# Patient Record
Sex: FEMALE | Race: WHITE | NOT HISPANIC OR LATINO | Employment: FULL TIME | URBAN - METROPOLITAN AREA
[De-identification: names, ages, dates, MRNs, and addresses within clinical notes are randomized per-mention and may not be internally consistent; named-entity substitution may affect disease eponyms.]

---

## 2022-07-07 PROBLEM — E66.811 OBESITY (BMI 30.0-34.9): Status: ACTIVE | Noted: 2022-07-07

## 2024-09-27 ENCOUNTER — OFFICE VISIT (OUTPATIENT)
Dept: GASTROENTEROLOGY | Facility: CLINIC | Age: 54
End: 2024-09-27
Payer: COMMERCIAL

## 2024-09-27 VITALS
HEART RATE: 83 BPM | DIASTOLIC BLOOD PRESSURE: 84 MMHG | WEIGHT: 177 LBS | HEIGHT: 65 IN | SYSTOLIC BLOOD PRESSURE: 130 MMHG | BODY MASS INDEX: 29.49 KG/M2

## 2024-09-27 DIAGNOSIS — K44.9 HIATAL HERNIA: ICD-10-CM

## 2024-09-27 DIAGNOSIS — R10.13 EPIGASTRIC PAIN: Primary | ICD-10-CM

## 2024-09-27 DIAGNOSIS — K21.9 GASTROESOPHAGEAL REFLUX DISEASE, UNSPECIFIED WHETHER ESOPHAGITIS PRESENT: ICD-10-CM

## 2024-09-27 PROCEDURE — 99999 PR PBB SHADOW E&M-EST. PATIENT-LVL IV: CPT | Mod: PBBFAC,,,

## 2024-09-27 NOTE — PROGRESS NOTES
"    [facility] Consultation Note    Reason for Consult:  Diagnoses of Epigastric pain and Hiatal hernia were pertinent to this visit.    PCP:   Michael Porras   [address] / [facility] [address]    Referring MD:  Juan M Daniel, Np  [address]  [address]  [place] [address]    HPI:  This is a 54 y.o. female here for evaluation of acid reflux for 3-4 years that has worsened in the past 6-12 months. Pt reports epigastric and esophageal pressure-like pain and fullness. She has been taking OTC nexium intermittently for the past 2 years. She was started on Nexium 40mg 10 days ago which has provided significant improvement in heartburn and regurgitation while laying down. Denies dysphagia.     Reports Hx of H.pylori that was treated with antibiotics several years ago. Pt then had similar symptoms last year and had HP antibody IgG test come back positive. States she never did a stool test, but reports being treated with antibiotics again. Pt does not remember which antibiotics. Takes Goody powder about 3x/week. Drinks 3 days out of the week and has about 5-6 drinks each time. States she has been avoiding alcohol for the past few weeks. Denies tobacco use.     Reports regular bowel movements daily. Denies diarrhea, constipation, bloody stools, rectal bleeding, melena, or unintentional weight loss. Denies known FHx of GI malignancies. Has not had any CRC screening yet.     Objective Findings:    Vital Signs:  /84   Pulse 83   Ht 5' 5" (1.651 m)   Wt 80.3 kg (177 lb 0.5 oz)   BMI 29.46 kg/m²   Body mass index is 29.46 kg/m².    Physical Exam:  General Appearance: Well appearing in no acute distress  Abdomen: Soft, non tender, non distended in all four quadrants. No hepatosplenomegaly, ascites, or mass.    I have personally reviewed labs and imaging results.     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   Small short segment intussusception of the small bowel.  This is most likely transient in nature.   Scattered solid and subsolid pulmonary nodules throughout the lung bases.  Findings may represent infectious/inflammatory process within lung neoplastic process not definitively ruled out.  Consider follow-up with CT chest in 3 months for recharacterization.   Suspected cholelithiasis without evidence of cholecystitis.   Mild circumferential bladder wall thickening, although likely related to underdistension of the bladder.  Correlate with urinalysis.   Additional findings as above.      Assessment:  1. Epigastric pain    2. Hiatal hernia      This is a 54 y.o F who presents with acid reflux for 3-4 years that has worsened in the past 6-12 months. Pt reports epigastric and esophageal pressure-like pain and fullness. Recently started Nexium qd with relief of reflux, but still endorses epigastric pressure. Hx of Hpylori in the past. Pt doesn't want to stop Nexium use to do stool test. +NSAID use.     - Ordered EGD/Colonoscopy  - Continue taking Nexium daily  - Avoid NSAIDs and alcohol  - F/u w PCP to get repeat CT, pulm nodules    RTC 2 months.     Thank you so much for allowing me to participate in the care of Lisy Strong    Sarah Abukhader, PA-C [facility]

## 2024-09-27 NOTE — PATIENT INSTRUCTIONS
Acid Reflux Tips  - Stay upright for at least 3 hours after eating  - Raise the head of the bed 4 to 6 inches    - Decrease excess weight    - Avoid citrus juices, acidic foods, alcohol, chocolate, caffeinated and carbonated beverages, fatty and fried foods   - Avoid tight-fitting clothing  - Avoid cigarettes and other tobacco products  - Take antacids (E.g. Tums, Rolaids, Maalox, Mylanta) for breakthrough symptoms  - If prescribed a Proton Pump Inhibitor (Protonix, Prilosec, Nexium, etc), take it 30-60 minutes before meals.

## 2024-10-01 ENCOUNTER — TELEPHONE (OUTPATIENT)
Dept: ENDOSCOPY | Facility: HOSPITAL | Age: 54
End: 2024-10-01
Payer: COMMERCIAL

## 2024-10-01 ENCOUNTER — PATIENT MESSAGE (OUTPATIENT)
Dept: ENDOSCOPY | Facility: HOSPITAL | Age: 54
End: 2024-10-01
Payer: COMMERCIAL

## 2024-10-01 NOTE — TELEPHONE ENCOUNTER
"----- Message from Drew Castanon sent at 9/30/2024  9:33 AM CDT -----  Regarding: FW: EGD/Colonoscopy    ----- Message -----  From: Paige Mcgraw PA-C  Sent: 9/27/2024  11:57 AM CDT  To: [facility]  Subject: EGD/Colonoscopy                                  Procedure: EGD/Colonoscopy    Diagnosis: Screening colonoscopy and Abdominal pain    Procedure Timing: Within 4 weeks (Urgent)    #If within 4 weeks selected, please fannie as high priority#    #If greater than 12 weeks, please select "5-12 weeks" and delay sending until 3 months prior to requested date#     Location: Any Site    Additional Scheduling Information: No scheduling concerns    Prep Specifications:Standard prep    Is the patient taking a GLP-1 Agonist:no    Have you attached a patient to this message: yes  "

## 2024-10-10 ENCOUNTER — TELEPHONE (OUTPATIENT)
Dept: INTERNAL MEDICINE | Facility: CLINIC | Age: 54
End: 2024-10-10
Payer: COMMERCIAL

## 2024-10-11 DIAGNOSIS — R09.81 SINUS CONGESTION: ICD-10-CM

## 2024-10-11 RX ORDER — AZELASTINE 1 MG/ML
1 SPRAY, METERED NASAL 2 TIMES DAILY
Qty: 30 ML | Refills: 0 | Status: SHIPPED | OUTPATIENT
Start: 2024-10-11 | End: 2025-10-11

## 2024-10-11 NOTE — TELEPHONE ENCOUNTER
No care due was identified.  [facility] Embedded Care Due Messages. Reference number: [id number].   10/11/2024 10:19:05 AM CDT

## 2024-10-22 ENCOUNTER — PATIENT MESSAGE (OUTPATIENT)
Dept: RESEARCH | Facility: HOSPITAL | Age: 54
End: 2024-10-22
Payer: COMMERCIAL

## 2024-12-04 ENCOUNTER — PATIENT MESSAGE (OUTPATIENT)
Dept: INTERNAL MEDICINE | Facility: CLINIC | Age: 54
End: 2024-12-04
Payer: COMMERCIAL

## 2024-12-04 DIAGNOSIS — R91.8 OTHER NONSPECIFIC ABNORMAL FINDING OF LUNG FIELD: ICD-10-CM

## 2024-12-04 DIAGNOSIS — R91.8 MULTIPLE LUNG NODULES: Primary | ICD-10-CM

## 2024-12-17 ENCOUNTER — HOSPITAL ENCOUNTER (OUTPATIENT)
Dept: RADIOLOGY | Facility: OTHER | Age: 54
Discharge: HOME | End: 2024-12-17
Payer: COMMERCIAL

## 2024-12-17 DIAGNOSIS — R91.8 MULTIPLE LUNG NODULES: ICD-10-CM

## 2024-12-17 DIAGNOSIS — R91.8 OTHER NONSPECIFIC ABNORMAL FINDING OF LUNG FIELD: ICD-10-CM

## 2024-12-17 PROCEDURE — 71250 CT THORAX DX C-: CPT | Mod: 26,,, | Performed by: RADIOLOGY

## 2024-12-17 PROCEDURE — 71250 CT THORAX DX C-: CPT | Mod: TC

## 2025-01-05 ENCOUNTER — PATIENT MESSAGE (OUTPATIENT)
Dept: INTERNAL MEDICINE | Facility: CLINIC | Age: 55
End: 2025-01-05
Payer: COMMERCIAL

## 2025-01-07 ENCOUNTER — TELEPHONE (OUTPATIENT)
Dept: INTERNAL MEDICINE | Facility: CLINIC | Age: 55
End: 2025-01-07
Payer: COMMERCIAL

## 2025-01-29 ENCOUNTER — TELEPHONE (OUTPATIENT)
Dept: INTERNAL MEDICINE | Facility: CLINIC | Age: 55
End: 2025-01-29
Payer: COMMERCIAL

## 2025-01-29 ENCOUNTER — OFFICE VISIT (OUTPATIENT)
Dept: INTERNAL MEDICINE | Facility: CLINIC | Age: 55
End: 2025-01-29
Payer: COMMERCIAL

## 2025-01-29 ENCOUNTER — TELEPHONE (OUTPATIENT)
Dept: INTERNAL MEDICINE | Facility: CLINIC | Age: 55
End: 2025-01-29

## 2025-01-29 ENCOUNTER — ON-DEMAND VIRTUAL (OUTPATIENT)
Dept: URGENT CARE | Facility: CLINIC | Age: 55
End: 2025-01-29
Payer: COMMERCIAL

## 2025-01-29 ENCOUNTER — OCHSNER VIRTUAL EMERGENCY DEPARTMENT (OUTPATIENT)
Facility: CLINIC | Age: 55
End: 2025-01-29
Payer: COMMERCIAL

## 2025-01-29 ENCOUNTER — PATIENT MESSAGE (OUTPATIENT)
Dept: OPTOMETRY | Facility: CLINIC | Age: 55
End: 2025-01-29
Payer: COMMERCIAL

## 2025-01-29 VITALS
HEIGHT: 65 IN | WEIGHT: 175.25 LBS | BODY MASS INDEX: 29.2 KG/M2 | SYSTOLIC BLOOD PRESSURE: 128 MMHG | HEART RATE: 102 BPM | OXYGEN SATURATION: 98 % | DIASTOLIC BLOOD PRESSURE: 88 MMHG

## 2025-01-29 DIAGNOSIS — R51.9 NONINTRACTABLE HEADACHE, UNSPECIFIED CHRONICITY PATTERN, UNSPECIFIED HEADACHE TYPE: Primary | ICD-10-CM

## 2025-01-29 DIAGNOSIS — R51.9 ACUTE INTRACTABLE HEADACHE, UNSPECIFIED HEADACHE TYPE: Primary | ICD-10-CM

## 2025-01-29 DIAGNOSIS — G44.52 NEW DAILY PERSISTENT HEADACHE: Primary | ICD-10-CM

## 2025-01-29 DIAGNOSIS — H53.8 BLURRY VISION, BILATERAL: ICD-10-CM

## 2025-01-29 DIAGNOSIS — H53.8 BLURRY VISION: ICD-10-CM

## 2025-01-29 PROBLEM — J30.9 ALLERGIC RHINITIS: Status: ACTIVE | Noted: 2025-01-29

## 2025-01-29 PROCEDURE — 3079F DIAST BP 80-89 MM HG: CPT | Mod: CPTII,S$GLB,, | Performed by: FAMILY MEDICINE

## 2025-01-29 PROCEDURE — 3008F BODY MASS INDEX DOCD: CPT | Mod: CPTII,S$GLB,, | Performed by: FAMILY MEDICINE

## 2025-01-29 PROCEDURE — 3074F SYST BP LT 130 MM HG: CPT | Mod: CPTII,S$GLB,, | Performed by: FAMILY MEDICINE

## 2025-01-29 PROCEDURE — 99999 PR PBB SHADOW E&M-EST. PATIENT-LVL IV: CPT | Mod: PBBFAC,,, | Performed by: FAMILY MEDICINE

## 2025-01-29 PROCEDURE — 1159F MED LIST DOCD IN RCRD: CPT | Mod: CPTII,S$GLB,, | Performed by: FAMILY MEDICINE

## 2025-01-29 PROCEDURE — 99214 OFFICE O/P EST MOD 30 MIN: CPT | Mod: S$GLB,,, | Performed by: FAMILY MEDICINE

## 2025-01-29 RX ORDER — PROGESTERONE 100 MG/1
CAPSULE ORAL
COMMUNITY
Start: 2024-10-30

## 2025-01-29 RX ORDER — BUTALBITAL, ACETAMINOPHEN AND CAFFEINE 50; 325; 40 MG/1; MG/1; MG/1
1 TABLET ORAL EVERY 4 HOURS PRN
Qty: 30 TABLET | Refills: 0 | Status: SHIPPED | OUTPATIENT
Start: 2025-01-29 | End: 2025-01-29

## 2025-01-29 RX ORDER — BUTALBITAL, ACETAMINOPHEN AND CAFFEINE 50; 325; 40 MG/1; MG/1; MG/1
1 TABLET ORAL EVERY 4 HOURS PRN
Qty: 30 TABLET | Refills: 0 | Status: SHIPPED | OUTPATIENT
Start: 2025-01-29 | End: 2025-02-28

## 2025-01-29 NOTE — TELEPHONE ENCOUNTER
Called and spoke to MsJeri Tae. Is C/O splitting headache for 4-5 days not relieved by tylenol, aleve or goodies.  States her vision is also a bit blurrier than yesterday. Given appointment with Dr. Reilly at [facility] for 1320 today.

## 2025-01-29 NOTE — TELEPHONE ENCOUNTER
----- Message from Symramsey sent at 1/29/2025 10:49 AM CST -----  Patient is requesting a sooner appointment.  Patient declined first available appointment listed as well as another facility and provider .  Patient will not accept being placed on the waitlist and is requesting a message be sent to doctor.     Name of Caller:SELF     When is the first available appointment?N\A     Symptoms:BAD HEADACHES  AND BLURRY VISION      Would the patient rather a call back or a response via [facility]?CALLBACK     Best Call Back Number:[phone]     Additional Information:

## 2025-01-29 NOTE — PROGRESS NOTES
Subjective:      Patient ID: Lisy Strong is a 55 y.o. female.    Vitals:  vitals were not taken for this visit.     Chief Complaint: Blurred Vision (Headaches persistent for 4 days)      Visit Type: TELE AUDIOVISUAL - This visit was conducted virtually based on  subjective information and limited objective exam    Present with the patient at the time of consultation: TELEMED PRESENT WITH PATIENT: None  LOCATION OF PATIENT [place]  Two patient identifiers used to verify patient- saying out date of birth and full name.       History reviewed. No pertinent past medical history.  Past Surgical History:   Procedure Laterality Date     SECTION      COLONOSCOPY N/A 2024    Procedure: COLONOSCOPY;  Surgeon: Arash Lao MD;  [facility];  Service: Endoscopy;  Laterality: N/A;  Referral: Michael Porras,  / PEG / Inst portal - LW  3/22- lvm and portal msg for pc. DBM  3/26- reordered prep, left 2nd vm for pc. DBM  3/26- r/s to later date, instr to portal, pt is considering starting Wegovy/Ozempic and was instructed on holding instructions. DBM  6/    COSMETIC SURGERY      tummy tuck     Review of patient's allergies indicates:   Allergen Reactions    Penicillins Other (See Comments)     Current Outpatient Medications on File Prior to Visit   Medication Sig Dispense Refill    azelastine (ASTELIN) 137 mcg (0.1 %) nasal spray 1 spray (137 mcg total) by Nasal route 2 (two) times daily. 30 mL 0    esomeprazole (NEXIUM) 40 MG capsule Take 1 capsule (40 mg total) by mouth before breakfast. 30 capsule 11    FLUARIX QUAD 9452-0913, PF, 60 mcg (15 mcg x 4)/0.5 mL Syrg       fluconazole (DIFLUCAN) 200 MG Tab Take 400mg once weekly 2 times (Patient not taking: Reported on 2024) 4 tablet 2    ondansetron (ZOFRAN-ODT) 8 MG TbDL  (Patient not taking: Reported on 2024)      PFIZER COVID-19 ELKIN VACCN,PF, 30 mcg/0.3 mL injection       valACYclovir (VALTREX) 500 MG tablet Take 1  tablet (500 mg total) by mouth 2 (two) times daily. for 3 days 6 tablet 3     No current facility-administered medications on file prior to visit.     Family History   Problem Relation Name Age of Onset    Alzheimer's disease Mother      Cancer Father Frandy         brain tumor    Depression Sister Sanjeev     No Known Problems Brother      Mental illness Maternal Grandmother Destiny     Diabetes Maternal Grandfather Thiago     No Known Problems Paternal Grandmother      No Known Problems Paternal Grandfather      Arthritis Sister Lusa         Hands    Depression Sister Lusa     Depression Sister Doris     Depression Sister Bety     Colon cancer Neg Hx      Ovarian cancer Neg Hx             Ohs Peq Odvv Intake    1/29/2025 10:53 AM CST - Filed by Patient   What is your current physical address in the event of a medical emergency? [address], [place] [address]   Are you able to take your vital signs? No   Please attach any relevant images or files    Is your employer contracted with [facility]? No         54 yo female with c/o headaches for 4-5 days. She states she is also experiencing blurry vision more than normal. She denies sinus congestion, she denies light sensitivity. She denies nausea and vomiting. She states pain is located in forehead and behind eyes. She has tried goody powder. She denies pmh htn and migraines. She denies floaters currently.         Constitution: Negative.   HENT: Negative.     Cardiovascular: Negative.    Respiratory: Negative.     Gastrointestinal: Negative.    Endocrine: negative.   Genitourinary: Negative.  Negative for frequency and urgency.   Musculoskeletal: Negative.    Skin: Negative.    Allergic/Immunologic: Negative.    Neurological:  Positive for headaches.   Hematologic/Lymphatic: Negative.    Psychiatric/Behavioral: Negative.          Objective:   The physical exam was conducted virtually.    AAO x 3 ; no acute distress noted; appearance normal; mood and  behavior normal; thought process normal  Head- normocephalic  Nose- appears normal, no discharge or erythema  Eyes- pupils appear normal in size, no drainage, no erythema  Ears- normal appearing; no discharge, no erythema  Mouth- appears normal  Oropharynx- no erythema, lesions  Lungs- breathing at a normal rate, no acute distress noted  Heart- no reports of tachycardia, palpitations, chest pain  Abdomen- non distended, non tender reported by patient  Skin- warm and dry, no erythema or edema noted by patient or visualized  Psych- as above; no si/hi      Assessment:     1. Acute intractable headache, unspecified headache type    2. Blurry vision, bilateral        Plan:     Discussed with [facility]. Patient referred to urgent care for in person evaluation however patient elected ED for a through evaluation    Thank you for choosing [facility]!    Our goal in the [facility] is to always provide outstanding medical care. You may receive a survey by mail or e-mail in the next week regarding your experience today. We would greatly appreciate you completing and returning the survey. Your feedback provides us with a way to recognize our staff who provide very good care, and it helps us learn how to improve when your experience was below our aspiration of excellence.         We appreciate you trusting us with your medical care. We hope you feel better soon. We will be happy to take care of you for all of your future medical needs.    You must understand that you've received an Urgent Care treatment only and that you may be released before all your medical problems are known or treated. You, the patient, will arrange for follow up care as instructed.    Follow up with your PCP or specialty clinic as directed in the next 1-2 weeks if not improved or as needed.  You can call [phone] to schedule an appointment with the appropriate provider.    If your condition worsens we recommend that  you receive another evaluation in person, with your primary care provider, urgent care or at the emergency room immediately or contact your primary medical clinics after hours call service to discuss your concerns.         Acute intractable headache, unspecified headache type  -     Refer to Emergency Dept.    Blurry vision, bilateral  -     Refer to Emergency Dept.

## 2025-01-29 NOTE — PROGRESS NOTES
Subjective:       Patient ID: Lisy Strong is a 55 y.o. female.    Chief Complaint: Headache and Blurred Vision    HPI  History of Present Illness    CHIEF COMPLAINT:  Lisy presents today for persistent headache with visual changes.    HISTORY OF PRESENT ILLNESS:  She reports an atypical persistent headache located across forehead and behind eyes that began prior to her recent [place] trip. Started 5d ago and has been at [place] over the past 3-4d. Her typical headaches are occasional and not persistent. Over-the-counter medications including Tylenol have been ineffective in managing pain, though Goody's powder provided temporary relief for sleep. She also reports dental pain and neck discomfort, the latter associated with computer work. She endorses fatigue which she attributes to her recent [place] trip over the last 3-4 days.    VISION CHANGES:  She normally wears reading glasses and reports worsening blurry vision first noticed last night. She has had to significantly increase screen magnification while using laptop, with persistent blurriness despite wearing reading glasses and screen adjustments.    Family h/o inclu father who had brain tumor and knows several friends who recently diagnosed with brain tumors. Certainly concerning for her.     ROS:  General: -fever, -chills, +fatigue, -weight gain, -weight loss  Eyes: -vision changes, -redness, -discharge, +blurry vision  ENT: -ear pain, -nasal congestion, -sore throat  Cardiovascular: -chest pain, -palpitations, -lower extremity edema  Respiratory: -cough, -shortness of breath  Gastrointestinal: -abdominal pain, -nausea, -vomiting, -diarrhea, -constipation, -blood in stool  Genitourinary: -dysuria, -hematuria, -frequency  Musculoskeletal: -joint pain, -muscle pain  Skin: -rash, -lesion  Neurological: +headache, -dizziness, -numbness, -tingling  Psychiatric: -anxiety, -depression, -sleep difficulty       Lisy Strong is a 55 y.o. female for same day visit. PCP   "Weeks.     Review of Systems   Constitutional:  Negative for activity change, appetite change, chills and fever.   HENT:  Negative for congestion, sinus pressure, sore throat and trouble swallowing.    Eyes:  Positive for visual disturbance.   Respiratory:  Negative for shortness of breath.    Cardiovascular:  Negative for chest pain.   Gastrointestinal:  Negative for abdominal pain, constipation, diarrhea and nausea.   Genitourinary:  Negative for difficulty urinating and dysuria.   Neurological:  Positive for headaches. Negative for dizziness, speech difficulty, weakness and light-headedness.         History reviewed. No pertinent past medical history.     Prior to Admission medications    Medication Sig Start Date End Date Taking? Authorizing Provider   azelastine (ASTELIN) 137 mcg (0.1 %) nasal spray 1 spray (137 mcg total) by Nasal route 2 (two) times daily. 10/11/24 10/11/25  Juan M Daniel NP   esomeprazole (NEXIUM) 40 MG capsule Take 1 capsule (40 mg total) by mouth before breakfast. 9/17/24 9/17/25  Juan M Daniel NP   FLUARIX QUAD 1303-7189, PF, 60 mcg (15 mcg x 4)/0.5 mL Syrg  1/31/22   Provider, Historical   fluconazole (DIFLUCAN) 200 MG Tab Take 400mg once weekly 2 times  Patient not taking: Reported on 9/17/2024 9/5/24   Michael Porras DO   ondansetron (ZOFRAN-ODT) 8 MG TbDL     Provider, Historical   PFIZER COVID-19 ELKIN VACCN,PF, 30 mcg/0.3 mL injection  1/31/22   Provider, Historical   valACYclovir (VALTREX) 500 MG tablet Take 1 tablet (500 mg total) by mouth 2 (two) times daily. for 3 days 4/15/19 7/6/22  Sujatha Miller NP        Past medical history, surgical history, and family medical history reviewed and updated as appropriate.    Medications and allergies reviewed.     Objective:          Vitals:    01/29/25 1317   BP: 128/88   BP Location: Left arm   Patient Position: Sitting   Pulse: 102   SpO2: 98%   Weight: 79.5 kg (175 lb 4.3 oz)   Height: 5' 5" (1.651 m)     Body " mass index is 29.17 kg/m².  Physical Exam  Vitals and nursing note reviewed.   Constitutional:       General: She is not in acute distress.     Appearance: Normal appearance.   HENT:      Right Ear: Tympanic membrane, ear canal and external ear normal. There is no impacted cerumen.      Left Ear: Tympanic membrane, ear canal and external ear normal. There is no impacted cerumen.      Nose: No congestion or rhinorrhea.      Mouth/Throat:      Pharynx: No oropharyngeal exudate or posterior oropharyngeal erythema.   Eyes:      General:         Right eye: No discharge.         Left eye: No discharge.      Extraocular Movements: Extraocular movements intact.      Pupils: Pupils are equal, round, and reactive to light.   Cardiovascular:      Rate and Rhythm: Normal rate and regular rhythm.      Pulses: Normal pulses.      Heart sounds: Normal heart sounds. No murmur heard.  Pulmonary:      Effort: Pulmonary effort is normal. No respiratory distress.      Breath sounds: Normal breath sounds. No wheezing.   Musculoskeletal:      Cervical back: Normal range of motion.      Right lower leg: No edema.      Left lower leg: No edema.   Neurological:      Mental Status: She is alert and oriented to person, place, and time.      Cranial Nerves: No cranial nerve deficit.      Sensory: No sensory deficit.      Motor: No weakness.      Gait: Gait normal.   Psychiatric:         Mood and Affect: Mood normal.         Behavior: Behavior normal.         Lab Results   Component Value Date    WBC 6.43 09/10/2024    HGB 13.6 09/10/2024    HCT 41.2 09/10/2024     09/10/2024    ALT 18 09/10/2024    AST 26 09/10/2024     09/10/2024    K 4.1 09/10/2024     09/10/2024    CREATININE 0.7 09/10/2024    BUN 8 09/10/2024    CO2 28 09/10/2024       Assessment:       1. New daily persistent headache    2. Blurry vision          Plan:   1. New daily persistent headache  -     CT Head Without Contrast; Future; Expected date:  01/29/2025  -     Discontinue: butalbital-acetaminophen-caffeine -40 mg (FIORICET, ESGIC) -40 mg per tablet; Take 1 tablet by mouth every 4 (four) hours as needed for Pain.  Dispense: 30 tablet; Refill: 0  -     butalbital-acetaminophen-caffeine -40 mg (FIORICET, ESGIC) -40 mg per tablet; Take 1 tablet by mouth every 4 (four) hours as needed for Pain.  Dispense: 30 tablet; Refill: 0    2. Blurry vision  -     Ambulatory referral/consult to Optometry; Future; Expected date: 01/29/2025        Follow up should symptoms persist or worsen. If symptoms become severe, please report immediately to urgent care or emergency room for further evaluation. Patient voiced understanding.      No follow-ups on file.    Austyn Reilly MD  Family Medicine / Primary Care  [facility]  1/29/2025

## 2025-01-29 NOTE — PLAN OF CARE-OVED
[facility] Emergency Department Plan of Care Note  Referral Source: Urgent Care                          Referral Comment: Virtual     Chief Complaint   Patient presents with    Headache     54 y/o F being seen by [facility] urgent care for headache x 4 days with bilateral blurred vision.  No neurologic deficits otherwise reported.  Patient cannot check BP at home.  Advised urgent care but patient chose to go to the ED due to having been sent to the ED from  previously.      Recommendation: Urgent Care                     Recommendation comment: Patient chose ED    Encounter Diagnosis   Name Primary?    Nonintractable headache, unspecified chronicity pattern, unspecified headache type Yes          No orders of the defined types were placed in this encounter.

## 2025-01-29 NOTE — TELEPHONE ENCOUNTER
Good afternoon,     Patient was seen today by Dr. Reilly. He is wanting her to f/u within a week if possible. Next available is not until March 12th. If you could reach out to patient for her to possible be seen sooner.

## 2025-01-31 NOTE — TELEPHONE ENCOUNTER
Called and spoke to patient via telephone call. Informed patient I was calling to make an earlier appointment for pt. To be seen earlier by Dr. Porras or one of our providers. Pt. Informed that she did not feel like she needed to come in anymore, as she no longer had a headache. Patient stated after seeing Dr. Reilly, the tooth ache she had became worse as well and made an appointment with dentist. Pt. Stated that a cracked and infected tooth was found and an emergency root canal was performed yesterday, 1/30/25. Since then her headache has subsided. Pt. Also requested to have an appointment to have Zepbound prescribed as it was now approved for sleep apnea. Scheduled appt. With Dr. Good. Pt. Accepted appointment and verbalized gratitude and understanding.

## 2025-03-10 ENCOUNTER — PATIENT MESSAGE (OUTPATIENT)
Dept: INTERNAL MEDICINE | Facility: CLINIC | Age: 55
End: 2025-03-10
Payer: COMMERCIAL

## 2025-03-10 DIAGNOSIS — R09.81 SINUS CONGESTION: ICD-10-CM

## 2025-03-10 RX ORDER — AZELASTINE 1 MG/ML
1 SPRAY, METERED NASAL 2 TIMES DAILY
Qty: 30 ML | Refills: 0 | Status: SHIPPED | OUTPATIENT
Start: 2025-03-10 | End: 2026-03-10

## 2025-03-10 NOTE — TELEPHONE ENCOUNTER
Refill Encounter    PCP Visits: Recent Visits  Date Type Provider Dept   24 Office Visit Juan M Daniel NP [facility] Internal Medicine   Showing recent visits within past 360 days and meeting all other requirements  Future Appointments  No visits were found meeting these conditions.  Showing future appointments within next 720 days and meeting all other requirements      Last 3 Blood Pressure:   BP Readings from Last 3 Encounters:   25 128/88   24 130/84   24 120/74     Preferred Pharmacy:   [id number] [address] AT [facility]  [address] [place] [id number]  Phone: [phone] Fax: [phone]    Requested RX:  Requested Prescriptions     Pending Prescriptions Disp Refills    azelastine (ASTELIN) 137 mcg (0.1 %) nasal spray 30 mL 0     Si spray (137 mcg total) by Nasal route 2 (two) times daily.      RX Route: Normal

## 2025-03-10 NOTE — TELEPHONE ENCOUNTER
PA for esomeprazole (NEXIUM) 40 MG capsule: Approved on 3/10/25. Approved from 3/10/25 to 3/9/28.    Key: [id number]    Called and spoke to [facility] at [address] [facility] [address]. They stated that the med went through and insurance covered it. They also stated she could pick it up today.     Informed pt through MyChart of the above information.    Topical Clindamycin Pregnancy And Lactation Text: This medication is Pregnancy Category B and is considered safe during pregnancy. It is unknown if it is excreted in breast milk.

## 2025-03-17 ENCOUNTER — TELEPHONE (OUTPATIENT)
Dept: INTERNAL MEDICINE | Facility: CLINIC | Age: 55
End: 2025-03-17
Payer: COMMERCIAL

## 2025-03-17 NOTE — TELEPHONE ENCOUNTER
"Received ROBB request from:    [facility]  Phone: [phone]  Fax: [phone]    Forwarded ROBB request to [facility] dept.    Your fax has been successfully sent to [phone] at [phone].  ------------------------------------------------------------  From: [id number]  ------------------------------------------------------------  3/17/2025 3:56:51 PM Transmission Record          Sent to [phone] with remote ID "[id number]"          Result: (0/339;0/0) Success          Page record: 1 - 4          Elapsed time: 01:50 on channel 57   "

## 2025-04-04 ENCOUNTER — PATIENT MESSAGE (OUTPATIENT)
Dept: INTERNAL MEDICINE | Facility: CLINIC | Age: 55
End: 2025-04-04
Payer: COMMERCIAL

## 2025-04-04 DIAGNOSIS — M25.569 KNEE PAIN, UNSPECIFIED CHRONICITY, UNSPECIFIED LATERALITY: Primary | ICD-10-CM

## 2025-04-23 ENCOUNTER — HOSPITAL ENCOUNTER (OUTPATIENT)
Dept: RADIOLOGY | Facility: HOSPITAL | Age: 55
Discharge: HOME | End: 2025-04-23
Attending: FAMILY MEDICINE
Payer: COMMERCIAL

## 2025-04-23 VITALS — WEIGHT: 175 LBS | HEIGHT: 65 IN | BODY MASS INDEX: 29.16 KG/M2

## 2025-04-23 DIAGNOSIS — Z12.31 ENCOUNTER FOR SCREENING MAMMOGRAM FOR BREAST CANCER: ICD-10-CM

## 2025-04-23 PROCEDURE — 77067 SCR MAMMO BI INCL CAD: CPT | Mod: TC

## 2025-04-23 PROCEDURE — 77063 BREAST TOMOSYNTHESIS BI: CPT | Mod: 26,,, | Performed by: RADIOLOGY

## 2025-04-23 PROCEDURE — 77067 SCR MAMMO BI INCL CAD: CPT | Mod: 26,,, | Performed by: RADIOLOGY

## 2025-05-04 LAB
OHS QRS DURATION: 92 MS
OHS QTC CALCULATION: 463 MS

## 2025-07-01 ENCOUNTER — PATIENT MESSAGE (OUTPATIENT)
Dept: INTERNAL MEDICINE | Facility: CLINIC | Age: 55
End: 2025-07-01
Payer: COMMERCIAL

## 2025-08-07 ENCOUNTER — OFFICE VISIT (OUTPATIENT)
Dept: INTERNAL MEDICINE | Facility: CLINIC | Age: 55
End: 2025-08-07
Payer: COMMERCIAL

## 2025-08-07 VITALS — BODY MASS INDEX: 29.97 KG/M2 | HEIGHT: 65 IN | WEIGHT: 179.88 LBS

## 2025-08-07 DIAGNOSIS — E66.811 OBESITY (BMI 30.0-34.9): ICD-10-CM

## 2025-08-07 DIAGNOSIS — G47.33 OBSTRUCTIVE SLEEP APNEA SYNDROME: Primary | ICD-10-CM

## 2025-08-07 DIAGNOSIS — F41.8 PERFORMANCE ANXIETY: ICD-10-CM

## 2025-08-07 PROCEDURE — 98004 SYNCH AUDIO-VIDEO EST SF 10: CPT | Mod: 95,,,

## 2025-08-07 RX ORDER — TIRZEPATIDE 7.5 MG/.5ML
7.5 INJECTION, SOLUTION SUBCUTANEOUS
Qty: 4 PEN | Refills: 0 | Status: SHIPPED | OUTPATIENT
Start: 2025-08-07

## 2025-08-07 RX ORDER — PROPRANOLOL HYDROCHLORIDE 10 MG/1
10 TABLET ORAL DAILY PRN
Qty: 30 TABLET | Refills: 11 | Status: SHIPPED | OUTPATIENT
Start: 2025-08-07 | End: 2026-08-07

## 2025-08-07 NOTE — PROGRESS NOTES
The patient location is: [place]  The chief complaint leading to consultation is: lab work/weight loss    Visit type: audiovisual    Face to Face time with patient: 20  25 minutes of total time spent on the encounter, which includes face to face time and non-face to face time preparing to see the patient (eg, review of tests), Obtaining and/or reviewing separately obtained history, Documenting clinical information in the electronic or other health record, Independently interpreting results (not separately reported) and communicating results to the patient/family/caregiver, or Care coordination (not separately reported).         Each patient to whom he or she provides medical services by telemedicine is:  (1) informed of the relationship between the physician and patient and the respective role of any other health care provider with respect to management of the patient; and (2) notified that he or she may decline to receive medical services by telemedicine and may withdraw from such care at any time.    Notes:       CHIEF COMPLAINT     Chief Complaint   Patient presents with    Results     Talk about getting meds.       ADITYA Strong is a 55 y.o. female who presents for xxx today.    PCP is Michael Porras DO, patient is known to me. This note was generated with the assistance of ambient listening technology. Verbal consent was obtained by the patient and accompanying visitor(s) for the recording of patient appointment to facilitate this note. I attest to having reviewed and edited the generated note for accuracy, though some syntax or spelling errors may persist. Please contact the author of this note for any clarification.     History of Present Illness    Pt had lab work done prior to recent procedure and hse is curious about several results. Her albumin was 2.8 and her glucose was 142. The labs were liekly not fasted. Pt also reprts that she has increased her protien for other reasons, explained the albumin  should respond.     Pt also would like beta blockers for public speaking.    Pt reports difficult weight loss. She is currently on 0.5 mg of semaglutide. She reports smoothie for breakfast, salad with lean protein for lunch, and smoothie for dinner. She has stopped drinking as much as before. Was nightly, now it is rarely. She does admit that her exercise routine has fallen off over the last few months.         Past Medical History:  History reviewed. No pertinent past medical history.    Home Medications:  Prior to Admission medications    Medication Sig Start Date End Date Taking? Authorizing Provider   azelastine (ASTELIN) 137 mcg (0.1 %) nasal spray 1 spray (137 mcg total) by Nasal route 2 (two) times daily. 3/10/25 3/10/26 Yes Michael Porras,    esomeprazole (NEXIUM) 40 MG capsule Take 1 capsule (40 mg total) by mouth before breakfast. 9/17/24 9/17/25 Yes Juan M Daniel NP   fluconazole (DIFLUCAN) 200 MG Tab Take 400mg once weekly 2 times 9/5/24  Yes Michael Porras DO   progesterone (PROMETRIUM) 100 MG capsule TAKE ONE CAPSULE BY MOUTH NIGHTLY 10/30/24  Yes Provider, Historical   propranoloL (INDERAL) 10 MG tablet Take 1 tablet (10 mg total) by mouth daily as needed (anxiety). 8/7/25 8/7/26  Juan M Daniel NP   tirzepatide, weight loss, 7.5 mg/0.5 mL Soln Inject 7.5 mg into the skin every 7 days. 8/7/25   Juan M Daniel NP   valACYclovir (VALTREX) 500 MG tablet Take 1 tablet (500 mg total) by mouth 2 (two) times daily. for 3 days 4/15/19 7/6/22  Sujatha Miller NP       Review of Systems:  Review of Systems   Constitutional:  Negative for activity change and unexpected weight change.   HENT:  Negative for hearing loss, rhinorrhea and trouble swallowing.    Eyes:  Positive for visual disturbance. Negative for discharge.   Respiratory:  Negative for chest tightness and wheezing.    Cardiovascular:  Negative for chest pain and palpitations.   Gastrointestinal:  Negative for  "blood in stool, constipation, diarrhea and vomiting.   Endocrine: Negative for polydipsia and polyuria.   Genitourinary:  Negative for difficulty urinating, dysuria, hematuria and menstrual problem.   Musculoskeletal:  Negative for arthralgias, joint swelling and neck pain.   Neurological:  Negative for weakness and headaches.   Psychiatric/Behavioral:  Negative for confusion and dysphoric mood.        Health Maintainence:   Immunizations:  Health Maintenance         Date Due Completion Date    Hepatitis C Screening Never done ---    Lipid Panel Never done ---    HIV Screening Never done ---    Hemoglobin A1c (Diabetic Prevention Screening) Never done ---    Shingles Vaccine (1 of 2) Never done ---    Pneumococcal Vaccines (Age 50+) (1 of 1 - PCV) Never done ---    COVID-19 Vaccine (3 - 2024-25 season) 09/01/2024 4/1/2021    Influenza Vaccine (1) 09/01/2025 1/31/2022    Mammogram 04/23/2026 4/23/2025    Cervical Cancer Screening 05/15/2029 5/15/2024    TETANUS VACCINE 01/06/2030 1/6/2020    Colorectal Cancer Screening 06/25/2034 6/25/2024    RSV Vaccine (Age 60+ and Pregnant patients) (1 - 1-dose 75+ series) 01/06/2047 ---             PHYSICAL EXAM     Ht 5' 5" (1.651 m)   Wt 81.6 kg (179 lb 14.3 oz)   LMP  (LMP Unknown)   BMI 29.94 kg/m²     Physical Exam  HENT:      Head: Normocephalic and atraumatic.   Pulmonary:      Effort: Pulmonary effort is normal.   Neurological:      Mental Status: She is alert and oriented to person, place, and time.   Psychiatric:         Behavior: Behavior normal.           ASSESSMENT/PLAN   Assessment & Plan      Discussed labs and causes. Pt satisfied with answers.     Prescribed propranolol for public speaking anxiety.    Prescribed tirzepatide for weight loss. Pt has a BMI 30, and has diagnosed sleep apnea due to her weight, noted in sleep study from 2022. She typically exercises 3 x week and eats 6268-6220 calories per day.         Lisy was seen today for results.    Diagnoses " and all orders for this visit:    Obstructive sleep apnea syndrome  -     tirzepatide, weight loss, 7.5 mg/0.5 mL Soln; Inject 7.5 mg into the skin every 7 days.    Obesity (BMI 30.0-34.9)  -     tirzepatide, weight loss, 7.5 mg/0.5 mL Soln; Inject 7.5 mg into the skin every 7 days.    Performance anxiety  -     propranoloL (INDERAL) 10 MG tablet; Take 1 tablet (10 mg total) by mouth daily as needed (anxiety).          Juan M Daniel NP   Department of Internal Medicine - [facility]  7:59 AM

## 2025-08-22 ENCOUNTER — PATIENT MESSAGE (OUTPATIENT)
Dept: INTERNAL MEDICINE | Facility: CLINIC | Age: 55
End: 2025-08-22
Payer: COMMERCIAL

## 2025-09-02 ENCOUNTER — PATIENT MESSAGE (OUTPATIENT)
Dept: INTERNAL MEDICINE | Facility: CLINIC | Age: 55
End: 2025-09-02
Payer: COMMERCIAL